# Patient Record
Sex: FEMALE | Race: WHITE | NOT HISPANIC OR LATINO | URBAN - METROPOLITAN AREA
[De-identification: names, ages, dates, MRNs, and addresses within clinical notes are randomized per-mention and may not be internally consistent; named-entity substitution may affect disease eponyms.]

---

## 2017-05-30 ENCOUNTER — IMPORTED ENCOUNTER (OUTPATIENT)
Dept: URBAN - METROPOLITAN AREA CLINIC 31 | Facility: CLINIC | Age: 67
End: 2017-05-30

## 2017-05-30 PROBLEM — Z96.1: Noted: 2017-05-30

## 2017-05-30 PROBLEM — H04.123: Noted: 2017-05-30

## 2017-05-30 PROCEDURE — 92014 COMPRE OPH EXAM EST PT 1/>: CPT

## 2017-05-30 PROCEDURE — 92015 DETERMINE REFRACTIVE STATE: CPT

## 2018-07-10 ENCOUNTER — IMPORTED ENCOUNTER (OUTPATIENT)
Dept: URBAN - METROPOLITAN AREA CLINIC 31 | Facility: CLINIC | Age: 68
End: 2018-07-10

## 2018-07-10 PROBLEM — H26.492: Noted: 2018-07-10

## 2018-07-10 PROBLEM — Z96.1: Noted: 2018-07-10

## 2018-07-10 PROCEDURE — 92250 FUNDUS PHOTOGRAPHY W/I&R: CPT

## 2018-07-10 PROCEDURE — 92015 DETERMINE REFRACTIVE STATE: CPT

## 2018-07-10 PROCEDURE — 92014 COMPRE OPH EXAM EST PT 1/>: CPT

## 2018-07-10 NOTE — PATIENT DISCUSSION
PCO  OS: (Posterior Capsule Opacification)  Not visually significant at this time. Monitor for yag capsulotomy necessity.

## 2018-07-10 NOTE — PATIENT DISCUSSION
1.  Pseudophakia OU - IOLs stable. Monitor. 2. PCO  OS: (Posterior Capsule Opacification)  Not visually significant at this time. Monitor for yag capsulotomy necessity.

## 2019-08-14 ENCOUNTER — IMPORTED ENCOUNTER (OUTPATIENT)
Dept: URBAN - METROPOLITAN AREA CLINIC 31 | Facility: CLINIC | Age: 69
End: 2019-08-14

## 2019-08-14 PROBLEM — Z96.1: Noted: 2019-08-14

## 2019-08-14 PROBLEM — H26.492: Noted: 2019-08-14

## 2019-08-14 PROBLEM — H43.813: Noted: 2019-08-14

## 2019-08-14 PROBLEM — H04.123: Noted: 2019-08-14

## 2019-08-14 PROCEDURE — 92014 COMPRE OPH EXAM EST PT 1/>: CPT

## 2019-08-14 PROCEDURE — 92250 FUNDUS PHOTOGRAPHY W/I&R: CPT

## 2022-04-01 ASSESSMENT — VISUAL ACUITY
OS_SC: 20/25
OD_CC: J214''
OS_CC: J214''
OD_CC: 20/20
OS_CC: J214''
OS_SC: 20/20
OD_SC: 20/20
OS_CC: 20/25
OD_CC: J214''
OD_SC: 20/20

## 2022-04-01 ASSESSMENT — TONOMETRY
OD_IOP_MMHG: 8
OD_IOP_MMHG: 8
OS_IOP_MMHG: 8
OS_IOP_MMHG: 8
OD_IOP_MMHG: 10
OS_IOP_MMHG: 8

## 2022-07-09 ENCOUNTER — TELEPHONE ENCOUNTER (OUTPATIENT)
Dept: URBAN - METROPOLITAN AREA CLINIC 121 | Facility: CLINIC | Age: 72
End: 2022-07-09

## 2022-07-10 ENCOUNTER — TELEPHONE ENCOUNTER (OUTPATIENT)
Dept: URBAN - METROPOLITAN AREA CLINIC 121 | Facility: CLINIC | Age: 72
End: 2022-07-10

## 2022-08-22 ENCOUNTER — NEW PATIENT (OUTPATIENT)
Dept: URBAN - METROPOLITAN AREA CLINIC 31 | Facility: CLINIC | Age: 72
End: 2022-08-22

## 2022-08-22 DIAGNOSIS — Z96.1: ICD-10-CM

## 2022-08-22 DIAGNOSIS — H43.813: ICD-10-CM

## 2022-08-22 DIAGNOSIS — H04.123: ICD-10-CM

## 2022-08-22 DIAGNOSIS — H26.492: ICD-10-CM

## 2022-08-22 PROCEDURE — 92004 COMPRE OPH EXAM NEW PT 1/>: CPT

## 2022-08-22 PROCEDURE — 92015 DETERMINE REFRACTIVE STATE: CPT

## 2022-08-22 PROCEDURE — 92250 FUNDUS PHOTOGRAPHY W/I&R: CPT

## 2022-08-22 ASSESSMENT — VISUAL ACUITY
OS_SC: 20/25
OD_SC: 20/20

## 2024-04-22 ENCOUNTER — APPOINTMENT (RX ONLY)
Dept: URBAN - METROPOLITAN AREA CLINIC 121 | Facility: CLINIC | Age: 74
Setting detail: DERMATOLOGY
End: 2024-04-22

## 2024-04-22 DIAGNOSIS — L82.0 INFLAMED SEBORRHEIC KERATOSIS: ICD-10-CM

## 2024-04-22 DIAGNOSIS — D18.0 HEMANGIOMA: ICD-10-CM

## 2024-04-22 DIAGNOSIS — L82.1 OTHER SEBORRHEIC KERATOSIS: ICD-10-CM

## 2024-04-22 DIAGNOSIS — L81.4 OTHER MELANIN HYPERPIGMENTATION: ICD-10-CM

## 2024-04-22 DIAGNOSIS — Z41.9 ENCOUNTER FOR PROCEDURE FOR PURPOSES OTHER THAN REMEDYING HEALTH STATE, UNSPECIFIED: ICD-10-CM

## 2024-04-22 DIAGNOSIS — Z71.89 OTHER SPECIFIED COUNSELING: ICD-10-CM

## 2024-04-22 DIAGNOSIS — D22 MELANOCYTIC NEVI: ICD-10-CM

## 2024-04-22 PROBLEM — D22.5 MELANOCYTIC NEVI OF TRUNK: Status: ACTIVE | Noted: 2024-04-22

## 2024-04-22 PROBLEM — D18.01 HEMANGIOMA OF SKIN AND SUBCUTANEOUS TISSUE: Status: ACTIVE | Noted: 2024-04-22

## 2024-04-22 PROCEDURE — ? COUNSELING

## 2024-04-22 PROCEDURE — ? RECOMMENDATIONS

## 2024-04-22 PROCEDURE — 99203 OFFICE O/P NEW LOW 30 MIN: CPT | Mod: 25

## 2024-04-22 PROCEDURE — 17110 DESTRUCTION B9 LES UP TO 14: CPT

## 2024-04-22 PROCEDURE — ? ADDITIONAL NOTES

## 2024-04-22 PROCEDURE — ? LIQUID NITROGEN

## 2024-04-22 ASSESSMENT — LOCATION SIMPLE DESCRIPTION DERM
LOCATION SIMPLE: LEFT BREAST
LOCATION SIMPLE: RIGHT UPPER BACK
LOCATION SIMPLE: LEFT UPPER BACK
LOCATION SIMPLE: RIGHT POSTERIOR UPPER ARM
LOCATION SIMPLE: LEFT CHEEK
LOCATION SIMPLE: LEFT HAND

## 2024-04-22 ASSESSMENT — LOCATION DETAILED DESCRIPTION DERM
LOCATION DETAILED: RIGHT MEDIAL UPPER BACK
LOCATION DETAILED: LEFT INFERIOR CENTRAL MALAR CHEEK
LOCATION DETAILED: LEFT INFRAMAMMARY CREASE (INNER QUADRANT)
LOCATION DETAILED: RIGHT DISTAL POSTERIOR UPPER ARM
LOCATION DETAILED: LEFT ULNAR DORSAL HAND
LOCATION DETAILED: LEFT INFERIOR MEDIAL UPPER BACK
LOCATION DETAILED: RIGHT INFERIOR UPPER BACK

## 2024-04-22 ASSESSMENT — LOCATION ZONE DERM
LOCATION ZONE: ARM
LOCATION ZONE: HAND
LOCATION ZONE: TRUNK
LOCATION ZONE: FACE

## 2024-04-22 NOTE — PROCEDURE: RECOMMENDATIONS
Recommendation Preamble: The following recommendations were made during the visit: Laser
Detail Level: Zone
Render Risk Assessment In Note?: no

## 2024-04-22 NOTE — PROCEDURE: ADDITIONAL NOTES
Detail Level: Generalized
Render Risk Assessment In Note?: no
Additional Notes: Recommended laser. Quoted $450 for about 12 spots on the face

## 2024-04-22 NOTE — PROCEDURE: LIQUID NITROGEN
Spray Paint Technique: No
Medical Necessity Clause: This procedure was medically necessary because the lesions that were treated were:
Number Of Freeze-Thaw Cycles: 1 freeze-thaw cycle
Detail Level: Detailed
Post-Care Instructions: I reviewed with the patient in detail post-care instructions. Patient is to wear sunprotection, and avoid picking at any of the treated lesions. Pt may apply Vaseline to crusted or scabbing areas.
Show Spray Paint Technique Variable?: Yes
Duration Of Freeze Thaw-Cycle (Seconds): 5-10
Spray Paint Text: The liquid nitrogen was applied to the skin utilizing a spray paint frosting technique.
Consent: The patient's consent was obtained including but not limited to risks of crusting, scabbing, blistering, scarring, darker or lighter pigmentary change, recurrence, incomplete removal and infection.
Medical Necessity Information: It is in your best interest to select a reason for this procedure from the list below. All of these items fulfill various CMS LCD requirements except the new and changing color options.

## 2024-06-18 ENCOUNTER — OFFICE VISIT (OUTPATIENT)
Dept: URBAN - METROPOLITAN AREA CLINIC 7 | Facility: CLINIC | Age: 74
End: 2024-06-18